# Patient Record
Sex: FEMALE | Race: WHITE | NOT HISPANIC OR LATINO | Employment: FULL TIME | URBAN - METROPOLITAN AREA
[De-identification: names, ages, dates, MRNs, and addresses within clinical notes are randomized per-mention and may not be internally consistent; named-entity substitution may affect disease eponyms.]

---

## 2020-02-06 PROBLEM — 428283002 HISTORY OF POLYP OF COLON (SITUATION): Status: ACTIVE | Noted: 2019-11-14

## 2020-02-06 PROBLEM — 266433003 GASTRO-ESOPHAGEAL REFLUX DISEASE WITH ESOPHAGITIS: Status: ACTIVE | Noted: 2020-02-06

## 2024-10-29 ENCOUNTER — OFFICE VISIT (OUTPATIENT)
Dept: URBAN - METROPOLITAN AREA CLINIC 12 | Facility: CLINIC | Age: 54
End: 2024-10-29

## 2024-11-05 ENCOUNTER — OFFICE VISIT (OUTPATIENT)
Dept: URBAN - METROPOLITAN AREA CLINIC 98 | Facility: CLINIC | Age: 54
End: 2024-11-05
Payer: COMMERCIAL

## 2024-11-05 ENCOUNTER — LAB OUTSIDE AN ENCOUNTER (OUTPATIENT)
Dept: URBAN - METROPOLITAN AREA CLINIC 98 | Facility: CLINIC | Age: 54
End: 2024-11-05

## 2024-11-05 VITALS
HEIGHT: 67 IN | SYSTOLIC BLOOD PRESSURE: 111 MMHG | HEART RATE: 72 BPM | DIASTOLIC BLOOD PRESSURE: 76 MMHG | BODY MASS INDEX: 25.58 KG/M2 | TEMPERATURE: 97 F | WEIGHT: 163 LBS

## 2024-11-05 DIAGNOSIS — R10.31 RLQ ABDOMINAL PAIN: ICD-10-CM

## 2024-11-05 DIAGNOSIS — K21.9 CHRONIC GERD: ICD-10-CM

## 2024-11-05 DIAGNOSIS — Z86.0100 PERSONAL HISTORY OF COLONIC POLYPS: ICD-10-CM

## 2024-11-05 PROCEDURE — 99204 OFFICE O/P NEW MOD 45 MIN: CPT | Performed by: INTERNAL MEDICINE

## 2024-11-05 RX ORDER — BIOTIN 10 MG
1 TABLET TABLET ORAL ONCE A DAY
Qty: 30 | COMMUNITY

## 2024-11-05 RX ORDER — LEVOTHYROXINE SODIUM 100 UG/1
TAKE 1 TABLET BY MOUTH EVERY DAY TABLET ORAL
Qty: 90 UNSPECIFIED | Refills: 0 | Status: ACTIVE | COMMUNITY

## 2024-11-05 RX ORDER — IVERMECTIN 10 MG/G
APPLY TO FACE EVERY MORNING CREAM TOPICAL
Qty: 45 UNSPECIFIED | Refills: 5 | Status: ACTIVE | COMMUNITY

## 2024-11-05 RX ORDER — ALPRAZOLAM 0.25 MG/1
1 TABLET TABLET ORAL TWICE A DAY
Status: ACTIVE | COMMUNITY

## 2024-11-05 RX ORDER — SODIUM, POTASSIUM,MAG SULFATES 17.5-3.13G
354ML SOLUTION, RECONSTITUTED, ORAL ORAL
Qty: 345 MILLILITER | Refills: 0 | OUTPATIENT
Start: 2024-11-05 | End: 2024-11-06

## 2024-11-05 RX ORDER — NORETHINDRONE ACETATE AND ETHINYL ESTRADIOL, ETHINYL ESTRADIOL AND FERROUS FUMARATE 1MG-10(24)
TAKE 1 TABLET BY MOUTH EVERY DAY KIT ORAL
Qty: 84 UNSPECIFIED | Refills: 3 | COMMUNITY

## 2024-11-05 RX ORDER — ESOMEPRAZOLE MAGNESIUM 40 MG/1
1 CAPSULE CAPSULE, DELAYED RELEASE ORAL ONCE A DAY
Qty: 30 | Status: ON HOLD | COMMUNITY

## 2024-11-05 RX ORDER — MAGNESIUM OXIDE/MAG AA CHELATE 300 MG
1 CAPSULE WITH A MEAL CAPSULE ORAL ONCE A DAY
Qty: 30 | Status: ACTIVE | COMMUNITY

## 2024-11-05 RX ORDER — FLUTICASONE PROPIONATE 50 UG/1
1 SPRAY IN EACH NOSTRIL SPRAY, METERED NASAL TWICE A DAY
Status: ACTIVE | COMMUNITY

## 2024-11-05 RX ORDER — TRETINOIN 0.6 MG/G
APPLY TO ENTIRE FACE ONCE EVERY NIGHT GEL TOPICAL
Qty: 50 UNSPECIFIED | Refills: 5 | COMMUNITY

## 2024-11-05 RX ORDER — SODIUM PICOSULFATE, MAGNESIUM OXIDE, AND ANHYDROUS CITRIC ACID 10; 3.5; 12 MG/160ML; G/160ML; G/160ML
AS DIRECTED LIQUID ORAL
Qty: 1 KIT | Refills: 0 | Status: ON HOLD | COMMUNITY
Start: 2019-06-19

## 2024-11-05 RX ORDER — POLYPODIUM LEUCOTOMOS EXTRACT 240 MG
1 CAPSULE CAPSULE ORAL ONCE A DAY
Qty: 30 | Status: ACTIVE | COMMUNITY

## 2024-11-05 NOTE — HPI-TODAY'S VISIT:
Patient referred by Ramos Blanco MD for surveillance colonoscopy and evaluation of chronic GERD.. Copy of this consult OV sent to Dr. Blanco. 55 yo patient who's due for surveillance colonoscopy. Patient w no major LGI complaints: denies abdominal pain, constipation, diarrhea, melenic stools, GIB and no anorexia or weight loss.  FHx rectal Ca in her sister. No FHx  colon pp / IBD / GI malignancies / hematologic disorders. Has been complaining of GERD sxs: heartburn, regurgitation, intermittent p-prandial epigastric pain, p-prandial epigastric fullness wo dysphagia / odynophagia. On Esomeprazole 40 mg po qd Normal bm's wo melenic stools nor bleeding. Normal CPE 1/24. Denies cardiorespiratory nor constitutional sxs. No other complaints after extensive ROS.

## 2024-11-08 ENCOUNTER — TELEPHONE ENCOUNTER (OUTPATIENT)
Dept: URBAN - METROPOLITAN AREA CLINIC 98 | Facility: CLINIC | Age: 54
End: 2024-11-08

## 2024-11-10 ENCOUNTER — DASHBOARD ENCOUNTERS (OUTPATIENT)
Age: 54
End: 2024-11-10

## 2024-11-10 PROBLEM — 235595009: Status: ACTIVE | Noted: 2024-11-05

## 2024-11-14 ENCOUNTER — LAB OUTSIDE AN ENCOUNTER (OUTPATIENT)
Dept: URBAN - METROPOLITAN AREA CLINIC 98 | Facility: CLINIC | Age: 54
End: 2024-11-14

## 2024-12-03 ENCOUNTER — OFFICE VISIT (OUTPATIENT)
Dept: URBAN - METROPOLITAN AREA CLINIC 98 | Facility: CLINIC | Age: 54
End: 2024-12-03

## 2024-12-13 ENCOUNTER — OFFICE VISIT (OUTPATIENT)
Dept: URBAN - METROPOLITAN AREA LAB 3 | Facility: LAB | Age: 54
End: 2024-12-13
Payer: COMMERCIAL

## 2024-12-13 DIAGNOSIS — Z86.0101 H/O ADENOMATOUS POLYP OF COLON: ICD-10-CM

## 2024-12-13 DIAGNOSIS — K31.7 BENIGN GASTRIC POLYP: ICD-10-CM

## 2024-12-13 DIAGNOSIS — K22.89 OTHER SPECIFIED DISEASE OF ESOPHAGUS: ICD-10-CM

## 2024-12-13 DIAGNOSIS — K29.60 ADENOPAPILLOMATOSIS GASTRICA: ICD-10-CM

## 2024-12-13 PROCEDURE — G0105 COLORECTAL SCRN; HI RISK IND: HCPCS | Performed by: INTERNAL MEDICINE

## 2024-12-13 PROCEDURE — 43239 EGD BIOPSY SINGLE/MULTIPLE: CPT | Performed by: INTERNAL MEDICINE

## 2024-12-13 PROCEDURE — 0529F INTRVL 3/>YR PTS CLNSCP DOCD: CPT | Performed by: INTERNAL MEDICINE

## 2024-12-13 RX ORDER — TRETINOIN 0.6 MG/G
APPLY TO ENTIRE FACE ONCE EVERY NIGHT GEL TOPICAL
Qty: 50 UNSPECIFIED | Refills: 5 | COMMUNITY

## 2024-12-13 RX ORDER — BIOTIN 10 MG
1 TABLET TABLET ORAL ONCE A DAY
Qty: 30 | COMMUNITY

## 2024-12-13 RX ORDER — LEVOTHYROXINE SODIUM 100 UG/1
TAKE 1 TABLET BY MOUTH EVERY DAY TABLET ORAL
Qty: 90 UNSPECIFIED | Refills: 0 | Status: ACTIVE | COMMUNITY

## 2024-12-13 RX ORDER — POLYPODIUM LEUCOTOMOS EXTRACT 240 MG
1 CAPSULE CAPSULE ORAL ONCE A DAY
Qty: 30 | Status: ACTIVE | COMMUNITY

## 2024-12-13 RX ORDER — ALPRAZOLAM 0.25 MG/1
1 TABLET TABLET ORAL TWICE A DAY
Status: ACTIVE | COMMUNITY

## 2024-12-13 RX ORDER — FLUTICASONE PROPIONATE 50 UG/1
1 SPRAY IN EACH NOSTRIL SPRAY, METERED NASAL TWICE A DAY
Status: ACTIVE | COMMUNITY

## 2024-12-13 RX ORDER — NORETHINDRONE ACETATE AND ETHINYL ESTRADIOL, ETHINYL ESTRADIOL AND FERROUS FUMARATE 1MG-10(24)
TAKE 1 TABLET BY MOUTH EVERY DAY KIT ORAL
Qty: 84 UNSPECIFIED | Refills: 3 | COMMUNITY

## 2024-12-13 RX ORDER — SODIUM PICOSULFATE, MAGNESIUM OXIDE, AND ANHYDROUS CITRIC ACID 10; 3.5; 12 MG/160ML; G/160ML; G/160ML
AS DIRECTED LIQUID ORAL
Qty: 1 KIT | Refills: 0 | Status: ON HOLD | COMMUNITY
Start: 2019-06-19

## 2024-12-13 RX ORDER — IVERMECTIN 10 MG/G
APPLY TO FACE EVERY MORNING CREAM TOPICAL
Qty: 45 UNSPECIFIED | Refills: 5 | Status: ACTIVE | COMMUNITY

## 2024-12-13 RX ORDER — MAGNESIUM OXIDE/MAG AA CHELATE 300 MG
1 CAPSULE WITH A MEAL CAPSULE ORAL ONCE A DAY
Qty: 30 | Status: ACTIVE | COMMUNITY

## 2024-12-13 RX ORDER — ESOMEPRAZOLE MAGNESIUM 40 MG/1
1 CAPSULE CAPSULE, DELAYED RELEASE ORAL ONCE A DAY
Qty: 30 | Status: ON HOLD | COMMUNITY

## 2025-03-03 ENCOUNTER — OFFICE VISIT (OUTPATIENT)
Dept: URBAN - METROPOLITAN AREA TELEHEALTH 2 | Facility: TELEHEALTH | Age: 55
End: 2025-03-03
Payer: COMMERCIAL

## 2025-03-03 DIAGNOSIS — K21.9 CHRONIC GERD: ICD-10-CM

## 2025-03-03 DIAGNOSIS — Z86.0100 PERSONAL HISTORY OF COLONIC POLYPS: ICD-10-CM

## 2025-03-03 DIAGNOSIS — K57.30 DIVERTICULOSIS OF COLON WITHOUT DIVERTICULITIS: ICD-10-CM

## 2025-03-03 PROBLEM — 398311004: Status: ACTIVE | Noted: 2025-03-03

## 2025-03-03 PROCEDURE — 99214 OFFICE O/P EST MOD 30 MIN: CPT | Performed by: INTERNAL MEDICINE

## 2025-03-03 RX ORDER — LEVOTHYROXINE SODIUM 100 UG/1
TAKE 1 TABLET BY MOUTH EVERY DAY TABLET ORAL
Qty: 90 UNSPECIFIED | Refills: 0 | Status: ACTIVE | COMMUNITY

## 2025-03-03 RX ORDER — TRETINOIN 0.6 MG/G
APPLY TO ENTIRE FACE ONCE EVERY NIGHT GEL TOPICAL
Qty: 50 UNSPECIFIED | Refills: 5 | COMMUNITY

## 2025-03-03 RX ORDER — NORETHINDRONE ACETATE AND ETHINYL ESTRADIOL, ETHINYL ESTRADIOL AND FERROUS FUMARATE 1MG-10(24)
TAKE 1 TABLET BY MOUTH EVERY DAY KIT ORAL
Qty: 84 UNSPECIFIED | Refills: 3 | COMMUNITY

## 2025-03-03 RX ORDER — MAGNESIUM OXIDE/MAG AA CHELATE 300 MG
1 CAPSULE WITH A MEAL CAPSULE ORAL ONCE A DAY
Qty: 30 | Status: ACTIVE | COMMUNITY

## 2025-03-03 RX ORDER — ESOMEPRAZOLE MAGNESIUM 40 MG/1
1 CAPSULE CAPSULE, DELAYED RELEASE ORAL ONCE A DAY
Qty: 30 | Status: ON HOLD | COMMUNITY

## 2025-03-03 RX ORDER — ALPRAZOLAM 0.25 MG/1
1 TABLET TABLET ORAL TWICE A DAY
Status: ACTIVE | COMMUNITY

## 2025-03-03 RX ORDER — IVERMECTIN 10 MG/G
APPLY TO FACE EVERY MORNING CREAM TOPICAL
Qty: 45 UNSPECIFIED | Refills: 5 | Status: ACTIVE | COMMUNITY

## 2025-03-03 RX ORDER — POLYPODIUM LEUCOTOMOS EXTRACT 240 MG
1 CAPSULE CAPSULE ORAL ONCE A DAY
Qty: 30 | Status: ACTIVE | COMMUNITY

## 2025-03-03 RX ORDER — FLUTICASONE PROPIONATE 50 UG/1
1 SPRAY IN EACH NOSTRIL SPRAY, METERED NASAL TWICE A DAY
Status: ACTIVE | COMMUNITY

## 2025-03-03 RX ORDER — SODIUM PICOSULFATE, MAGNESIUM OXIDE, AND ANHYDROUS CITRIC ACID 10; 3.5; 12 MG/160ML; G/160ML; G/160ML
AS DIRECTED LIQUID ORAL
Qty: 1 KIT | Refills: 0 | Status: ON HOLD | COMMUNITY
Start: 2019-06-19

## 2025-03-03 RX ORDER — BIOTIN 10 MG
1 TABLET TABLET ORAL ONCE A DAY
Qty: 30 | COMMUNITY

## 2025-03-03 NOTE — HPI-TODAY'S VISIT:
This is a Telehealth OV to which patient has agreed to. Limitations of TH discussed; patient understands  and agrees to proceed. Pt's at home during this virtual OV. 54 yo patient  for GERD follow-up. Overall, has been feeling better. CHELLE controlled w diet and current treatment. No alarm sxs. EGD 2/25: GERD, sm HH, Hp-negative gastritis and normal duodenal bx's. Colonoscopy 11/24: L-diverticulosis and sigmoid spasm. RUQ-US 11/24: normal. No anorexia or weight loss.  FHx HPV - related rectal Ca in her sister. On Esomeprazole 40 mg po qd Normal bm's wo melenic stools nor bleeding. Normal CPE 1/24. Denies cardiorespiratory nor constitutional sxs. No other complaints after extensive ROS.

## 2025-03-03 NOTE — PHYSICAL EXAM GASTROINTESTINAL
Abdomen , soft, nontender, nondistended , no guarding or rigidity , no masses palpable , normal bowel sounds , Liver and Spleen,  no hepatosplenomegaly , liver nontender Regular Diet - No restrictions

## 2025-03-04 ENCOUNTER — OFFICE VISIT (OUTPATIENT)
Dept: URBAN - METROPOLITAN AREA TELEHEALTH 2 | Facility: TELEHEALTH | Age: 55
End: 2025-03-04